# Patient Record
Sex: MALE | Race: OTHER | NOT HISPANIC OR LATINO | URBAN - METROPOLITAN AREA
[De-identification: names, ages, dates, MRNs, and addresses within clinical notes are randomized per-mention and may not be internally consistent; named-entity substitution may affect disease eponyms.]

---

## 2022-11-09 ENCOUNTER — INPATIENT (INPATIENT)
Facility: HOSPITAL | Age: 62
LOS: 0 days | Discharge: HOME | DRG: 287 | End: 2022-11-10
Attending: GENERAL PRACTICE | Admitting: GENERAL PRACTICE
Payer: MEDICAID

## 2022-11-09 ENCOUNTER — TRANSCRIPTION ENCOUNTER (OUTPATIENT)
Age: 62
End: 2022-11-09

## 2022-11-09 VITALS
TEMPERATURE: 98 F | DIASTOLIC BLOOD PRESSURE: 99 MMHG | SYSTOLIC BLOOD PRESSURE: 173 MMHG | HEART RATE: 66 BPM | WEIGHT: 169.98 LBS | RESPIRATION RATE: 18 BRPM | HEIGHT: 68 IN | OXYGEN SATURATION: 99 %

## 2022-11-09 DIAGNOSIS — R07.9 CHEST PAIN, UNSPECIFIED: ICD-10-CM

## 2022-11-09 DIAGNOSIS — I25.10 ATHEROSCLEROTIC HEART DISEASE OF NATIVE CORONARY ARTERY WITHOUT ANGINA PECTORIS: ICD-10-CM

## 2022-11-09 DIAGNOSIS — I10 ESSENTIAL (PRIMARY) HYPERTENSION: ICD-10-CM

## 2022-11-09 LAB
ALBUMIN SERPL ELPH-MCNC: 4.5 G/DL — SIGNIFICANT CHANGE UP (ref 3.3–5)
ALP SERPL-CCNC: 61 U/L — SIGNIFICANT CHANGE UP (ref 40–120)
ALT FLD-CCNC: 18 U/L — SIGNIFICANT CHANGE UP (ref 10–45)
ANION GAP SERPL CALC-SCNC: 12 MMOL/L — SIGNIFICANT CHANGE UP (ref 5–17)
APTT BLD: 33.4 SEC — SIGNIFICANT CHANGE UP (ref 27.5–35.5)
AST SERPL-CCNC: 20 U/L — SIGNIFICANT CHANGE UP (ref 10–40)
BASE EXCESS BLDV CALC-SCNC: 0 MMOL/L — SIGNIFICANT CHANGE UP (ref -2–3)
BASOPHILS # BLD AUTO: 0.04 K/UL — SIGNIFICANT CHANGE UP (ref 0–0.2)
BASOPHILS NFR BLD AUTO: 0.9 % — SIGNIFICANT CHANGE UP (ref 0–2)
BILIRUB SERPL-MCNC: 0.3 MG/DL — SIGNIFICANT CHANGE UP (ref 0.2–1.2)
BUN SERPL-MCNC: 13 MG/DL — SIGNIFICANT CHANGE UP (ref 7–23)
CA-I SERPL-SCNC: 1.21 MMOL/L — SIGNIFICANT CHANGE UP (ref 1.15–1.33)
CALCIUM SERPL-MCNC: 9.4 MG/DL — SIGNIFICANT CHANGE UP (ref 8.4–10.5)
CHLORIDE BLDV-SCNC: 104 MMOL/L — SIGNIFICANT CHANGE UP (ref 96–108)
CHLORIDE SERPL-SCNC: 104 MMOL/L — SIGNIFICANT CHANGE UP (ref 96–108)
CHOLEST SERPL-MCNC: 194 MG/DL — SIGNIFICANT CHANGE UP
CK MB CFR SERPL CALC: 1.4 NG/ML — SIGNIFICANT CHANGE UP (ref 0–6.7)
CO2 BLDV-SCNC: 28 MMOL/L — HIGH (ref 22–26)
CO2 SERPL-SCNC: 24 MMOL/L — SIGNIFICANT CHANGE UP (ref 22–31)
CREAT SERPL-MCNC: 0.96 MG/DL — SIGNIFICANT CHANGE UP (ref 0.5–1.3)
EGFR: 89 ML/MIN/1.73M2 — SIGNIFICANT CHANGE UP
EOSINOPHIL # BLD AUTO: 0.16 K/UL — SIGNIFICANT CHANGE UP (ref 0–0.5)
EOSINOPHIL NFR BLD AUTO: 3.7 % — SIGNIFICANT CHANGE UP (ref 0–6)
GAS PNL BLDV: 135 MMOL/L — LOW (ref 136–145)
GAS PNL BLDV: SIGNIFICANT CHANGE UP
GAS PNL BLDV: SIGNIFICANT CHANGE UP
GLUCOSE BLDV-MCNC: 103 MG/DL — HIGH (ref 70–99)
GLUCOSE SERPL-MCNC: 106 MG/DL — HIGH (ref 70–99)
HCO3 BLDV-SCNC: 26 MMOL/L — SIGNIFICANT CHANGE UP (ref 22–29)
HCT VFR BLD CALC: 40.8 % — SIGNIFICANT CHANGE UP (ref 39–50)
HCT VFR BLDA CALC: 41 % — SIGNIFICANT CHANGE UP (ref 39–51)
HDLC SERPL-MCNC: 69 MG/DL — SIGNIFICANT CHANGE UP
HGB BLD CALC-MCNC: 13.6 G/DL — SIGNIFICANT CHANGE UP (ref 12.6–17.4)
HGB BLD-MCNC: 13.5 G/DL — SIGNIFICANT CHANGE UP (ref 13–17)
IMM GRANULOCYTES NFR BLD AUTO: 0.2 % — SIGNIFICANT CHANGE UP (ref 0–0.9)
INR BLD: 1.03 RATIO — SIGNIFICANT CHANGE UP (ref 0.88–1.16)
LACTATE BLDV-MCNC: 1.1 MMOL/L — SIGNIFICANT CHANGE UP (ref 0.5–2)
LIPID PNL WITH DIRECT LDL SERPL: 111 MG/DL — HIGH
LYMPHOCYTES # BLD AUTO: 1.48 K/UL — SIGNIFICANT CHANGE UP (ref 1–3.3)
LYMPHOCYTES # BLD AUTO: 34.3 % — SIGNIFICANT CHANGE UP (ref 13–44)
MAGNESIUM SERPL-MCNC: 2.2 MG/DL — SIGNIFICANT CHANGE UP (ref 1.6–2.6)
MCHC RBC-ENTMCNC: 30.7 PG — SIGNIFICANT CHANGE UP (ref 27–34)
MCHC RBC-ENTMCNC: 33.1 GM/DL — SIGNIFICANT CHANGE UP (ref 32–36)
MCV RBC AUTO: 92.7 FL — SIGNIFICANT CHANGE UP (ref 80–100)
MONOCYTES # BLD AUTO: 0.31 K/UL — SIGNIFICANT CHANGE UP (ref 0–0.9)
MONOCYTES NFR BLD AUTO: 7.2 % — SIGNIFICANT CHANGE UP (ref 2–14)
NEUTROPHILS # BLD AUTO: 2.31 K/UL — SIGNIFICANT CHANGE UP (ref 1.8–7.4)
NEUTROPHILS NFR BLD AUTO: 53.7 % — SIGNIFICANT CHANGE UP (ref 43–77)
NON HDL CHOLESTEROL: 125 MG/DL — SIGNIFICANT CHANGE UP
NRBC # BLD: 0 /100 WBCS — SIGNIFICANT CHANGE UP (ref 0–0)
NT-PROBNP SERPL-SCNC: 24 PG/ML — SIGNIFICANT CHANGE UP (ref 0–300)
PCO2 BLDV: 49 MMHG — SIGNIFICANT CHANGE UP (ref 42–55)
PH BLDV: 7.34 — SIGNIFICANT CHANGE UP (ref 7.32–7.43)
PLATELET # BLD AUTO: 167 K/UL — SIGNIFICANT CHANGE UP (ref 150–400)
PO2 BLDV: 27 MMHG — SIGNIFICANT CHANGE UP (ref 25–45)
POTASSIUM BLDV-SCNC: 3.9 MMOL/L — SIGNIFICANT CHANGE UP (ref 3.5–5.1)
POTASSIUM SERPL-MCNC: 4.1 MMOL/L — SIGNIFICANT CHANGE UP (ref 3.5–5.3)
POTASSIUM SERPL-SCNC: 4.1 MMOL/L — SIGNIFICANT CHANGE UP (ref 3.5–5.3)
PROT SERPL-MCNC: 7.7 G/DL — SIGNIFICANT CHANGE UP (ref 6–8.3)
PROTHROM AB SERPL-ACNC: 12 SEC — SIGNIFICANT CHANGE UP (ref 10.5–13.4)
RBC # BLD: 4.4 M/UL — SIGNIFICANT CHANGE UP (ref 4.2–5.8)
RBC # FLD: 12.6 % — SIGNIFICANT CHANGE UP (ref 10.3–14.5)
SAO2 % BLDV: 43.1 % — LOW (ref 67–88)
SARS-COV-2 RNA SPEC QL NAA+PROBE: SIGNIFICANT CHANGE UP
SARS-COV-2 RNA SPEC QL NAA+PROBE: SIGNIFICANT CHANGE UP
SODIUM SERPL-SCNC: 140 MMOL/L — SIGNIFICANT CHANGE UP (ref 135–145)
TRIGL SERPL-MCNC: 69 MG/DL — SIGNIFICANT CHANGE UP
TROPONIN T, HIGH SENSITIVITY RESULT: 7 NG/L — SIGNIFICANT CHANGE UP (ref 0–51)
TROPONIN T, HIGH SENSITIVITY RESULT: 7 NG/L — SIGNIFICANT CHANGE UP (ref 0–51)
TSH SERPL-MCNC: 1.93 UIU/ML — SIGNIFICANT CHANGE UP (ref 0.27–4.2)
UFH PPP CHRO-ACNC: 0.05 IU/ML — LOW (ref 0.3–0.7)
WBC # BLD: 4.31 K/UL — SIGNIFICANT CHANGE UP (ref 3.8–10.5)
WBC # FLD AUTO: 4.31 K/UL — SIGNIFICANT CHANGE UP (ref 3.8–10.5)

## 2022-11-09 PROCEDURE — 99152 MOD SED SAME PHYS/QHP 5/>YRS: CPT

## 2022-11-09 PROCEDURE — 99285 EMERGENCY DEPT VISIT HI MDM: CPT

## 2022-11-09 PROCEDURE — 93010 ELECTROCARDIOGRAM REPORT: CPT

## 2022-11-09 PROCEDURE — 93458 L HRT ARTERY/VENTRICLE ANGIO: CPT | Mod: 26

## 2022-11-09 PROCEDURE — 99223 1ST HOSP IP/OBS HIGH 75: CPT | Mod: GC

## 2022-11-09 PROCEDURE — 71046 X-RAY EXAM CHEST 2 VIEWS: CPT | Mod: 26

## 2022-11-09 RX ORDER — ONDANSETRON 8 MG/1
4 TABLET, FILM COATED ORAL EVERY 8 HOURS
Refills: 0 | Status: DISCONTINUED | OUTPATIENT
Start: 2022-11-09 | End: 2022-11-10

## 2022-11-09 RX ORDER — LANOLIN ALCOHOL/MO/W.PET/CERES
3 CREAM (GRAM) TOPICAL AT BEDTIME
Refills: 0 | Status: DISCONTINUED | OUTPATIENT
Start: 2022-11-09 | End: 2022-11-10

## 2022-11-09 RX ORDER — SODIUM CHLORIDE 9 MG/ML
1000 INJECTION INTRAMUSCULAR; INTRAVENOUS; SUBCUTANEOUS
Refills: 0 | Status: DISCONTINUED | OUTPATIENT
Start: 2022-11-09 | End: 2022-11-10

## 2022-11-09 RX ORDER — ASPIRIN/CALCIUM CARB/MAGNESIUM 324 MG
81 TABLET ORAL DAILY
Refills: 0 | Status: DISCONTINUED | OUTPATIENT
Start: 2022-11-09 | End: 2022-11-10

## 2022-11-09 RX ORDER — CARVEDILOL PHOSPHATE 80 MG/1
6.25 CAPSULE, EXTENDED RELEASE ORAL EVERY 12 HOURS
Refills: 0 | Status: DISCONTINUED | OUTPATIENT
Start: 2022-11-09 | End: 2022-11-10

## 2022-11-09 RX ORDER — SODIUM CHLORIDE 9 MG/ML
250 INJECTION INTRAMUSCULAR; INTRAVENOUS; SUBCUTANEOUS ONCE
Refills: 0 | Status: COMPLETED | OUTPATIENT
Start: 2022-11-09 | End: 2022-11-09

## 2022-11-09 RX ORDER — ACETAMINOPHEN 500 MG
650 TABLET ORAL EVERY 6 HOURS
Refills: 0 | Status: DISCONTINUED | OUTPATIENT
Start: 2022-11-09 | End: 2022-11-10

## 2022-11-09 RX ADMIN — SODIUM CHLORIDE 75 MILLILITER(S): 9 INJECTION INTRAMUSCULAR; INTRAVENOUS; SUBCUTANEOUS at 16:18

## 2022-11-09 RX ADMIN — CARVEDILOL PHOSPHATE 6.25 MILLIGRAM(S): 80 CAPSULE, EXTENDED RELEASE ORAL at 13:43

## 2022-11-09 RX ADMIN — SODIUM CHLORIDE 750 MILLILITER(S): 9 INJECTION INTRAMUSCULAR; INTRAVENOUS; SUBCUTANEOUS at 16:19

## 2022-11-09 RX ADMIN — Medication 81 MILLIGRAM(S): at 13:42

## 2022-11-09 NOTE — H&P ADULT - PROBLEM SELECTOR PLAN 1
appreciated cardiology consult  cath today given history  medical optimization  ( pt not taking any meds at baseline !!)   tele  echo  monitor

## 2022-11-09 NOTE — ED ADULT NURSE NOTE - HISTORY OF COVID-19 VACCINATION
Try taking your metoprolol earlier in the day by around 9AM.  Continue potassium supplement, take the magnesium supplement as prescribed in addition.  Discontinue any additional magnesium you are taking.     No

## 2022-11-09 NOTE — H&P ADULT - PROBLEM SELECTOR PLAN 3
pt not on meds at home  continue meds as above  adjust as needed  discussed with pt re compliance and follow up

## 2022-11-09 NOTE — ED PROVIDER NOTE - ATTENDING CONTRIBUTION TO CARE
Dr. Dacosta (Attending Physician)  I performed a history and physical exam of the patient and discussed their management with the resident. I reviewed the resident's note and agree with the documented findings and plan of care. My medical decision making and observations are found above.

## 2022-11-09 NOTE — ED PROVIDER NOTE - CLINICAL SUMMARY MEDICAL DECISION MAKING FREE TEXT BOX
Cards Fellow Guero contacted, will see patient, pt appears well, hypertensive to 170s systolic, pt with little pain, will observe and reassess pending results. Pt. with ho cad pw left sided chest pain now improved. initial trop negative at [facility]. has LBBB without Sgarbossa criteria. Will repeat trop d/w Cards Fellow Guero contacted, will see patient, pt appears well, hypertensive to 170s systolic, pt with little pain, will observe and reassess pending results.    Dr. Dacosta (Attending Physician)

## 2022-11-09 NOTE — PATIENT PROFILE ADULT - FALL HARM RISK - UNIVERSAL INTERVENTIONS
Bed in lowest position, wheels locked, appropriate side rails in place/Call bell, personal items and telephone in reach/Instruct patient to call for assistance before getting out of bed or chair/Non-slip footwear when patient is out of bed/[place] to call system/Physically safe environment - no spills, clutter or unnecessary equipment/Purposeful Proactive Rounding/Room/bathroom lighting operational, light cord in reach

## 2022-11-09 NOTE — CONSULT NOTE ADULT - SUBJECTIVE AND OBJECTIVE BOX
Patient seen and evaluated at bedside    Chief Complaint: Chest pain    HPI:    62-year-old male with a past medical history of hypertension, high cholesterol, CAD with stent placed in , noncompliant with medications presenting with chest pain began this morning approximately 6 AM. He was walking to his car this morning when he felt a sharp left side chest pain that did not radiate. He denies any diaphoresis, nauseaa/vomting, shortness of breath. States that it is a pressure-like pain in his left chest that has decreased spontaneously, is down from a 5-6 down to a 1-2. Patient was transferred by EMS from [facility] for concerns for possible new left bundle branch block.  Patient received aspirin and Plavix according to EMS at [facility].   Of note, patient has been having chest pain on exertion, especially when he walks faster, this has been going on for 5-6 months. The pain is relieved with rest. He notes that he had a cath in  that showed only 35% blockages in his coronary arteries. He has not had an ischemic workup since. Patient states that he is noncompliant with his medications, occasionally sees his doctor, Dr. Ramona Tony. Denies any numbness, tingling, paresthesias, trouble breathing, dizziness, headache, rash, vision changes, hearing changes.      PMHx:   HTN (hypertension)    CAD (coronary artery disease)    H/O heart artery stent        PSHx:       Allergies:  No Known Allergies      Home Meds:    Patient does not take any meds    Current Medications:       FAMILY HISTORY:    Father  of MI at 73      Social History:  NEver smoker, drinks 2 shots of whiskey nightly. No drugs    REVIEW OF SYSTEMS:  Constitutional:     [ ] negative [ ] fevers [ ] chills [ ] weight loss [ ] weight gain  HEENT:                  [ ] negative [ ] dry eyes [ ] eye irritation [ ] postnasal drip [ ] nasal congestion  CV:                         [ ] negative  [ ] chest pain [ ] orthopnea [ ] palpitations [ ] murmur  Resp:                     [ ] negative [ ] cough [ ] shortness of breath [ ] dyspnea [ ] wheezing [ ] sputum [ ]hemoptysis  GI:                          [ ] negative [ ] nausea [ ] vomiting [ ] diarrhea [ ] constipation [ ] abd pain [ ] dysphagia   :                        [ ] negative [ ] dysuria [ ] nocturia [ ] hematuria [ ] increased urinary frequency  Musculoskeletal: [ ] negative [ ] back pain [ ] myalgias [ ] arthralgias [ ] fracture  Skin:                       [ ] negative [ ] rash [ ] itch  Neurological:        [ ] negative [ ] headache [ ] dizziness [ ] syncope [ ] weakness [ ] numbness  Psychiatric:           [ ] negative [ ] anxiety [ ] depression  Endocrine:            [ ] negative [ ] diabetes [ ] thyroid problem  Heme/Lymph:      [ ] negative [ ] anemia [ ] bleeding problem  Allergic/Immune: [ ] negative [ ] itchy eyes [ ] nasal discharge [ ] hives [ ] angioedema    [ ] All other systems negative  [ ] Unable to assess ROS due to      Physical Exam:  T(F): 97.8 (), Max: 98 ()  HR: 71 () (66 - 71)  BP: 187/102 () (173/99 - 187/102)  RR: 20 ()  SpO2: 100% ()  GENERAL: No acute distress, well-developed  HEAD:  Atraumatic, Normocephalic  ENT: EOMI, PERRLA, conjunctiva and sclera clear, Neck supple, No JVD, moist mucosa  CHEST/LUNG: Clear to auscultation bilaterally; No wheeze, equal breath sounds bilaterally   BACK: No spinal tenderness  HEART: Regular rate and rhythm; No murmurs, rubs, or gallops  ABDOMEN: Soft, Nontender, Nondistended; Bowel sounds present  EXTREMITIES:  No clubbing, cyanosis, or edema    Cardiovascular Diagnostic Testing:    ECG: Personally reviewed:   sinus rhythm with rate of 60, LBBB pattern with no prior EKG    Echo: Personally reviewed:    Imaging:    CXR: Personally reviewed    Labs: Personally reviewed                        13.5   4.31  )-----------( 167      ( 2022 09:32 )             40.8         140  |  104  |  13  ----------------------------<  106<H>  4.1   |  24  |  0.96    Ca    9.4      2022 09:32  Mg     2.2         TPro  7.7  /  Alb  4.5  /  TBili  0.3  /  DBili  x   /  AST  20  /  ALT  18  /  AlkPhos  61        Serum Pro-Brain Natriuretic Peptide: 24 pg/mL ( @ 09:32)            CARDIAC MARKERS ( 2022 09:32 )  7 ng/L / x     / x     / x     / x     / x          No Known Allergies      T(F): 97.8 (), Max: 98 ()  HR: 71 () (66 - 71)  BP: 187/102 () (173/99 - 187/102)  RR: 20 ()  SpO2: 100% () Patient seen and evaluated at bedside    Chief Complaint: Chest pain    HPI:  62-year-old male with a past medical history of hypertension, high cholesterol with diagnostic angiogram done in , non-compliant with medications.  Now presenting with chest pain which began this morning at approximately 6 AM. He was walking to his car when he felt a left sided chest pain that did not radiate. He denies any diaphoresis, nausea/vomiting, shortness of breath.  He states that it was a pressure-like pain that decreased spontaneously, down from a 5-6 intensity to a 1-2.  He was seen in ED at [facility] and transferred here due to concern of a new left bundle branch block.  Patient received aspirin and Plavix at [facility].     In retrospect, patient has been having chest pain on exertion over past 6 mos., especially when he walks faster, relieved with rest.     He recall that he had a cath in  that showed only 35% blockage in his coronary arteries. He has not had an ischemic testing since. Patient acknowledges that he is non compliant with his medications and only occasionally sees his doctor, Dr. Ramona Tony.     Denies any numbness, tingling, paresthesias, trouble breathing, dizziness, headache, rash, vision changes, hearing changes.      PMHx:   HTN (hypertension)  CAD (coronary artery disease)      Allergies:  No Known Allergies      Home Meds:  Patient does not take any meds      FAMILY HISTORY:    Father  of MI at 73      Social History:  Never smoker, drinks 2 shots of whiskey nightly. No drugs      REVIEW OF SYSTEMS:  Constitutional:     [ x] negative [ ] fevers [ ] chills [ ] weight loss [ ] weight gain  HEENT:                  [x ] negative [ ] dry eyes [ ] eye irritation [ ] postnasal drip [ ] nasal congestion  CV:                         [ ] negative  [ x] chest pain [ ] orthopnea [ ] palpitations [ ] murmur  Resp:                     [ x] negative [ ] cough [ ] shortness of breath [ ] dyspnea [ ] wheezing [ ] sputum [ ]hemoptysis  GI:                          [x ] negative [ ] nausea [ ] vomiting [ ] diarrhea [ ] constipation [ ] abd pain [ ] dysphagia   :                        [x ] negative [ ] dysuria [ ] nocturia [ ] hematuria [ ] increased urinary frequency  Musculoskeletal: [x ] negative [ ] back pain [ ] myalgias [ ] arthralgias [ ] fracture  Skin:                       [x ] negative [ ] rash [ ] itch  Neurological:        [x ] negative [ ] headache [ ] dizziness [ ] syncope [ ] weakness [ ] numbness  Psychiatric:           [x ] negative [ ] anxiety [ ] depression  Endocrine:            [x ] negative [ ] diabetes [ ] thyroid problem  Heme/Lymph:      [x ] negative [ ] anemia [ ] bleeding problem  Allergic/Immune: [x ] negative [ ] itchy eyes [ ] nasal discharge [ ] hives [ ] angioedema  [x ] All other systems negative      Physical Exam:  T(F): 97.8 (), Max: 98 ()  HR: 71 () (66 - 71)  BP: 187/102 () (173/99 - 187/102)  RR: 20 ()  SpO2: 100% ()    GENERAL: No acute distress, well-developed  HEAD:  Atraumatic, Normocephalic  ENT: EOMI, PERRLA, conjunctiva and sclera clear, Neck supple, No JVD, moist mucosa  CHEST/LUNG: Clear to auscultation bilaterally; No wheeze, equal breath sounds bilaterally   BACK: No spinal tenderness  HEART: Regular rate and rhythm; No murmurs, rubs, or gallops  ABDOMEN: Soft, Nontender, Nondistended; Bowel sounds present  EXTREMITIES:  No clubbing, cyanosis, or edema      ECG:   NSR at 60 bpm; LBBB pattern.  No prior EKG available.      CXR:  Clear lungs      LABS:                      13.5   4.31  )-----------( 167      ( 2022 09:32 )             40.8       140  |  104  |  13  ----------------------------<  106<H>  4.1   |  24  |  0.96    Ca    9.4      2022 09:32  Mg     2.2         TPro  7.7  /  Alb  4.5  /  TBili  0.3  /  DBili  x   /  AST  20  /  ALT  18  /  AlkPhos  61      Serum Pro-Brain Natriuretic Peptide: 24 pg/mL ( @ 09:32)      Trop T:   7 -> 7

## 2022-11-09 NOTE — CONSULT NOTE ADULT - ASSESSMENT
62-year-old male with a past medical history of hypertension, high cholesterol, CAD with stent placed in 2008, noncompliant with medications presenting with chest pain began this morning approximately 6 AM. He also has been having chest pain with exertin over the past 5-6 months consistent with angina.     #Unstable angina  Recommendations:  - Will talk to interventional attending regarding plan for cath  - Start patient on ASA 81mg  - Check LFT, lipid profile, and TSH.  - Most likely will need to be on a high intensity statin  - Start patient on coreg 6.25 BID as patient is currently hypertensive  - Continue to monitor on telemetry  - Please obtain TTE  - Trop negative, can stop trending  - trend CMP, Mg, phos, CBC  - maintain K > 4, Mg > 2  - maintain active T&S, Hgb > 8; will monitor for s/s of bleeding    62-year-old male with a past medical history of hypertension, high cholesterol, non obstructive CAD at cath in 2008.  Non compliant with medications.  Now presenting with chest pain this morning approximately 6 AM; in retrospect, he also has been having chest pain with exertion over the past 5-6 months, consistent with angina.       REC:  #1  Unstable angina  - Will talk to interventional attending regarding plan for cath  - Start patient on ASA 81mg  - Check LFT, lipid profile, and TSH.  - Most likely will need to be on a high intensity statin  - Continue to monitor on telemetry  - Please obtain TTE  - Trop negative, can stop trending  - trend CMP, Mg, phos, CBC  - maintain K > 4, Mg > 2  - maintain active T&S, Hgb > 8; will monitor for s/s of bleeding     #2  HTN  - Start patient on Coreg 6.25 BID as patient is currently hypertensive      Keshav Mariano M.D.  Cardiology fellow    Plan discussed with cardiology fellow.  Patient seen and examined.  Hx., exam and labs as above.  I agree with the assessment and recommendations, which I have reviewed and edited where appropriate.  Luisito Daniel M.D.  Cardiology Attending, Consult Service    For Cardiology consults and questions, all Cardiology service information can be found 24/7 on [email or web address] - use password: [email or web address] to log in.

## 2022-11-09 NOTE — H&P ADULT - HISTORY OF PRESENT ILLNESS
>>>>>>Medical Attending Initial / Admission note<<<<<<  -------------------------------------------------------------------------------  CHIEF COMPLAINT & HISTORY OF PRESENT ILLNESS:      Patient is a 63yo Male with past medical history as bellow presented to ED with chest pain in the left side of chest  chest pain began this morning approximately 6 AM.  Patient was transferred by EMS from [facility] for concerns for possible new left bundle branch block.    Patient received aspirin and Plavix according to EMS at [facility], patient denies any radiation of the chest pain to his arms, neck, back.  States that it is a pressure-like pain in his left chest that has decreased spontaneously, is down from a 5-6 down to a 1-2.  Patient states that he is noncompliant with his medications, occasionally sees his doctor, Dr. Ramona Tony.  Denies any numbness, tingling, paresthesias, trouble breathing, dizziness, headache, rash, vision changes, hearing changes  symptoms all resolved now     --------------------------------------------------------------------------------  PAST MEDICAL / SURGICAL  HISTORY:    ? h/o HTN , not on meds   CAD post ? stent in 2008    no surgeries reported    --------------------------------------------------------------------------------  FAMILY HISTORY:    Father: MI    --------------------------------------------------------------------------------  SOCIAL HISTORY:  Alcohol: occasional ETOH use : 4X/ week  Smoking: None reported    no drugs, MJ  works as a      --------------------------------------------------------------------------------  ALLERGIES:     none reported     --------------------------------------------------------------------------------  MEDICATIONS:    none reported , no vitamins / supplements either     --------------------------------------------------------------------------------  REVIEW OF SYSTEM:    GEN: no fever, no chills, no pain now   RESP: no SOB, no cough, no sputum  CVS: no chest pain now, no palpitations, no edema  GI: no abdominal pain, no nausea, no vomiting, no constipation, no diarrhea  : no dysuria, no frequency, no hematuria  Neuro: no headache, no dizziness  PSYCH: no anxiety, no depression  Derm : no itching, no rash    --------------------------------------------------------------------------------  VITAL SIGNS:  Height (cm): 172.7  Weight (kg): 76  BMI (kg/m2): 25.5  Vital Signs Last 24 HrsT(C): 36.6 (11-09-22 @ 10:55)  T(F): 97.9 (11-09-22 @ 10:55), Max: 98 (11-09-22 @ 09:01)  HR: 55 (11-09-22 @ 10:55) (55 - 71)  BP: 161/90 (11-09-22 @ 10:55)  RR: 18 (11-09-22 @ 10:55) (18 - 20)  SpO2: 98% (11-09-22 @ 10:55) (98% - 100%)      --------------------------------------------------------------------------------  PHYSICAL EXAM:    GEN: A&O X 3 , NAD , comfortable, pleasant, calm  HEENT: NCAT, PERRL, MMM, hearing intact  Neck: supple , no JVD  CVS: S1S2 , regular , No M/R/G appreciated  PULM: CTA B/L,  no W/R/R appreciated  ABD.: soft. non tender, non distended,  bowel sounds present  Extrem: intact pulses , no edema   Derm: No rash , no ecchymoses  PSYCH : normal mood,  no delusion not anxious    --------------------------------------------------------------------------------  LAB AND IMAGING:   [As muriel, reviewed]    --------------------------------------------------------------------------------  ASSESSMENT AND PLAN:   [As bellow]    --------------------  Case discussed with pt  ___________________________  H. LIA Adair.  Pager: [phone]  11-09-22

## 2022-11-09 NOTE — H&P ADULT - ASSESSMENT
cp  HTN   Patient is a 61yo Male with past medical history as bellow presented to ED with chest pain in the left side of chest  chest pain began this morning approximately 6 AM.  Patient was transferred by EMS from [facility] for concerns for possible new left bundle branch block.    Patient received aspirin and Plavix according to EMS at [facility], patient denies any radiation of the chest pain to his arms, neck, back.  States that it is a pressure-like pain in his left chest that has decreased spontaneously, is down from a 5-6 down to a 1-2.  Patient states that he is noncompliant with his medications, occasionally sees his doctor, Dr. Ramona Tony.  Denies any numbness, tingling, paresthesias, trouble breathing, dizziness, headache, rash, vision changes, hearing changes  symptoms all resolved now

## 2022-11-09 NOTE — ED ADULT NURSE NOTE - OBJECTIVE STATEMENT
62 y male presents transfer from [place] for chest pain beginning at 0600 this morning. patient reports to working as a - started work and began having 5/10 L sided chest pain; denies sob, lightheadedness, dizziness, n/v/d, radiating pain. received 300 mg Plavix and 325 ASA at [place] ay 0826. a7ox3. skin warm and dry. breathing comfortably in bed- no distress. reports to 3/10 chest pain currently- placed on cardiac monitor- HR in 60s-70s noted. abdomen soft nondistended. MD at bedside; ekg obtained- awaiting labs radiology and dispo.

## 2022-11-09 NOTE — ED PROVIDER NOTE - NSICDXPASTMEDICALHX_GEN_ALL_CORE_FT
PAST MEDICAL HISTORY:  CAD (coronary artery disease)     H/O heart artery stent     HTN (hypertension)

## 2022-11-09 NOTE — ED PROVIDER NOTE - PHYSICAL EXAMINATION
Physical Exam:   GEN: in no acute distress, AAOx3  HENT: NCAT, MMM, no stridor  EYES: PERRLA, EOMI  RESP: CTAB, no respiratory distress  CV: normal rate and regular rhythm, S1 S2. no mrgs  ABD: soft and NTND  EXT: No edema, No bony deformity of extremities  SKIN: No skin breaks, skin color normal for race  NEURO: CN grossly intact, No focal motor or sensory deficits.

## 2022-11-09 NOTE — ED PROVIDER NOTE - OBJECTIVE STATEMENT
62-year-old male with a past medical history of hypertension, high cholesterol, CAD with stent placed in 2008, noncompliant with medications presenting with chest pain began this morning approximately 6 AM.  Patient was transferred by EMS from [facility] for concerns for possible new left bundle branch block.  Patient received aspirin and Plavix according to EMS at [facility], patient denies any radiation of the chest pain to his arms, neck, back.  States that it is a pressure-like pain in his left chest that has decreased spontaneously, is down from a 5-6 down to a 1-2.  Patient states that he is noncompliant with his medications, occasionally sees his doctor, Dr. Ramona Tony.Denies any numbness, tingling, paresthesias, trouble breathing, dizziness, headache, rash, vision changes, hearing changes.

## 2022-11-09 NOTE — ED PROVIDER NOTE - PROGRESS NOTE DETAILS
Pt labs grossly normal, pt with some residual pain on reassessment, discussed the case with cardiology, which despite nega repeat troponin here (neg at [facility] as well), decision was made to due exploratory catheterization, discussed the case with dr. izquierdo, who will admit the patient under his service. pt endorsed in stable condition.

## 2022-11-10 ENCOUNTER — TRANSCRIPTION ENCOUNTER (OUTPATIENT)
Age: 62
End: 2022-11-10

## 2022-11-10 VITALS
TEMPERATURE: 98 F | DIASTOLIC BLOOD PRESSURE: 87 MMHG | SYSTOLIC BLOOD PRESSURE: 150 MMHG | RESPIRATION RATE: 19 BRPM | HEART RATE: 56 BPM | OXYGEN SATURATION: 98 %

## 2022-11-10 PROBLEM — Z00.00 ENCOUNTER FOR PREVENTIVE HEALTH EXAMINATION: Status: ACTIVE | Noted: 2022-11-10

## 2022-11-10 LAB
ALBUMIN SERPL ELPH-MCNC: 3.9 G/DL — SIGNIFICANT CHANGE UP (ref 3.3–5)
ALP SERPL-CCNC: 54 U/L — SIGNIFICANT CHANGE UP (ref 40–120)
ALT FLD-CCNC: 17 U/L — SIGNIFICANT CHANGE UP (ref 10–45)
ANION GAP SERPL CALC-SCNC: 12 MMOL/L — SIGNIFICANT CHANGE UP (ref 5–17)
AST SERPL-CCNC: 21 U/L — SIGNIFICANT CHANGE UP (ref 10–40)
BASOPHILS # BLD AUTO: 0.04 K/UL — SIGNIFICANT CHANGE UP (ref 0–0.2)
BASOPHILS NFR BLD AUTO: 0.9 % — SIGNIFICANT CHANGE UP (ref 0–2)
BILIRUB SERPL-MCNC: 0.4 MG/DL — SIGNIFICANT CHANGE UP (ref 0.2–1.2)
BUN SERPL-MCNC: 17 MG/DL — SIGNIFICANT CHANGE UP (ref 7–23)
CALCIUM SERPL-MCNC: 9 MG/DL — SIGNIFICANT CHANGE UP (ref 8.4–10.5)
CHLORIDE SERPL-SCNC: 103 MMOL/L — SIGNIFICANT CHANGE UP (ref 96–108)
CO2 SERPL-SCNC: 22 MMOL/L — SIGNIFICANT CHANGE UP (ref 22–31)
CREAT SERPL-MCNC: 1.31 MG/DL — HIGH (ref 0.5–1.3)
EGFR: 61 ML/MIN/1.73M2 — SIGNIFICANT CHANGE UP
EOSINOPHIL # BLD AUTO: 0.22 K/UL — SIGNIFICANT CHANGE UP (ref 0–0.5)
EOSINOPHIL NFR BLD AUTO: 4.8 % — SIGNIFICANT CHANGE UP (ref 0–6)
GLUCOSE SERPL-MCNC: 102 MG/DL — HIGH (ref 70–99)
HCT VFR BLD CALC: 39.3 % — SIGNIFICANT CHANGE UP (ref 39–50)
HGB BLD-MCNC: 13.1 G/DL — SIGNIFICANT CHANGE UP (ref 13–17)
IMM GRANULOCYTES NFR BLD AUTO: 0.2 % — SIGNIFICANT CHANGE UP (ref 0–0.9)
LYMPHOCYTES # BLD AUTO: 1.6 K/UL — SIGNIFICANT CHANGE UP (ref 1–3.3)
LYMPHOCYTES # BLD AUTO: 35.1 % — SIGNIFICANT CHANGE UP (ref 13–44)
MCHC RBC-ENTMCNC: 30.5 PG — SIGNIFICANT CHANGE UP (ref 27–34)
MCHC RBC-ENTMCNC: 33.3 GM/DL — SIGNIFICANT CHANGE UP (ref 32–36)
MCV RBC AUTO: 91.6 FL — SIGNIFICANT CHANGE UP (ref 80–100)
MONOCYTES # BLD AUTO: 0.39 K/UL — SIGNIFICANT CHANGE UP (ref 0–0.9)
MONOCYTES NFR BLD AUTO: 8.6 % — SIGNIFICANT CHANGE UP (ref 2–14)
NEUTROPHILS # BLD AUTO: 2.3 K/UL — SIGNIFICANT CHANGE UP (ref 1.8–7.4)
NEUTROPHILS NFR BLD AUTO: 50.4 % — SIGNIFICANT CHANGE UP (ref 43–77)
NRBC # BLD: 0 /100 WBCS — SIGNIFICANT CHANGE UP (ref 0–0)
PLATELET # BLD AUTO: 165 K/UL — SIGNIFICANT CHANGE UP (ref 150–400)
POTASSIUM SERPL-MCNC: 3.8 MMOL/L — SIGNIFICANT CHANGE UP (ref 3.5–5.3)
POTASSIUM SERPL-SCNC: 3.8 MMOL/L — SIGNIFICANT CHANGE UP (ref 3.5–5.3)
PROT SERPL-MCNC: 7 G/DL — SIGNIFICANT CHANGE UP (ref 6–8.3)
RBC # BLD: 4.29 M/UL — SIGNIFICANT CHANGE UP (ref 4.2–5.8)
RBC # FLD: 12.7 % — SIGNIFICANT CHANGE UP (ref 10.3–14.5)
SODIUM SERPL-SCNC: 137 MMOL/L — SIGNIFICANT CHANGE UP (ref 135–145)
WBC # BLD: 4.56 K/UL — SIGNIFICANT CHANGE UP (ref 3.8–10.5)
WBC # FLD AUTO: 4.56 K/UL — SIGNIFICANT CHANGE UP (ref 3.8–10.5)

## 2022-11-10 PROCEDURE — 85025 COMPLETE CBC W/AUTO DIFF WBC: CPT

## 2022-11-10 PROCEDURE — 99233 SBSQ HOSP IP/OBS HIGH 50: CPT | Mod: GC

## 2022-11-10 PROCEDURE — 82803 BLOOD GASES ANY COMBINATION: CPT

## 2022-11-10 PROCEDURE — 82330 ASSAY OF CALCIUM: CPT

## 2022-11-10 PROCEDURE — 84132 ASSAY OF SERUM POTASSIUM: CPT

## 2022-11-10 PROCEDURE — U0005: CPT

## 2022-11-10 PROCEDURE — 85730 THROMBOPLASTIN TIME PARTIAL: CPT

## 2022-11-10 PROCEDURE — 83880 ASSAY OF NATRIURETIC PEPTIDE: CPT

## 2022-11-10 PROCEDURE — C1887: CPT

## 2022-11-10 PROCEDURE — 85018 HEMOGLOBIN: CPT

## 2022-11-10 PROCEDURE — 84443 ASSAY THYROID STIM HORMONE: CPT

## 2022-11-10 PROCEDURE — C1769: CPT

## 2022-11-10 PROCEDURE — 93306 TTE W/DOPPLER COMPLETE: CPT | Mod: 26

## 2022-11-10 PROCEDURE — 80061 LIPID PANEL: CPT

## 2022-11-10 PROCEDURE — 82553 CREATINE MB FRACTION: CPT

## 2022-11-10 PROCEDURE — 80053 COMPREHEN METABOLIC PANEL: CPT

## 2022-11-10 PROCEDURE — 85014 HEMATOCRIT: CPT

## 2022-11-10 PROCEDURE — 84295 ASSAY OF SERUM SODIUM: CPT

## 2022-11-10 PROCEDURE — 71046 X-RAY EXAM CHEST 2 VIEWS: CPT

## 2022-11-10 PROCEDURE — 85520 HEPARIN ASSAY: CPT

## 2022-11-10 PROCEDURE — 82947 ASSAY GLUCOSE BLOOD QUANT: CPT

## 2022-11-10 PROCEDURE — 82435 ASSAY OF BLOOD CHLORIDE: CPT

## 2022-11-10 PROCEDURE — 93306 TTE W/DOPPLER COMPLETE: CPT

## 2022-11-10 PROCEDURE — 93458 L HRT ARTERY/VENTRICLE ANGIO: CPT

## 2022-11-10 PROCEDURE — 85610 PROTHROMBIN TIME: CPT

## 2022-11-10 PROCEDURE — 99285 EMERGENCY DEPT VISIT HI MDM: CPT

## 2022-11-10 PROCEDURE — C1894: CPT

## 2022-11-10 PROCEDURE — 83735 ASSAY OF MAGNESIUM: CPT

## 2022-11-10 PROCEDURE — 84484 ASSAY OF TROPONIN QUANT: CPT

## 2022-11-10 PROCEDURE — U0003: CPT

## 2022-11-10 PROCEDURE — 83605 ASSAY OF LACTIC ACID: CPT

## 2022-11-10 RX ORDER — CARVEDILOL PHOSPHATE 80 MG/1
1 CAPSULE, EXTENDED RELEASE ORAL
Qty: 60 | Refills: 0
Start: 2022-11-10 | End: 2022-12-09

## 2022-11-10 RX ORDER — ASPIRIN/CALCIUM CARB/MAGNESIUM 324 MG
1 TABLET ORAL
Qty: 30 | Refills: 0
Start: 2022-11-10 | End: 2022-12-09

## 2022-11-10 RX ORDER — ATORVASTATIN CALCIUM 80 MG/1
1 TABLET, FILM COATED ORAL
Qty: 30 | Refills: 0
Start: 2022-11-10 | End: 2022-12-09

## 2022-11-10 RX ORDER — ATORVASTATIN CALCIUM 80 MG/1
40 TABLET, FILM COATED ORAL AT BEDTIME
Refills: 0 | Status: DISCONTINUED | OUTPATIENT
Start: 2022-11-10 | End: 2022-11-10

## 2022-11-10 RX ADMIN — Medication 81 MILLIGRAM(S): at 06:07

## 2022-11-10 RX ADMIN — CARVEDILOL PHOSPHATE 6.25 MILLIGRAM(S): 80 CAPSULE, EXTENDED RELEASE ORAL at 06:07

## 2022-11-10 NOTE — DISCHARGE NOTE PROVIDER - HOSPITAL COURSE
HPI: Patient is a 61yo Male with past medical history as bellow presented to ED with chest pain in the left side of chest  chest pain began this morning approximately 6 AM.  Patient was transferred by EMS from [facility] for concerns for possible new left bundle branch block.    Patient received aspirin and Plavix according to EMS at [facility], patient denies any radiation of the chest pain to his arms, neck, back.  States that it is a pressure-like pain in his left chest that has decreased spontaneously, is down from a 5-6 down to a 1-2.  Patient states that he is noncompliant with his medications, occasionally sees his doctor, Dr. Ramona Tony.  Denies any numbness, tingling, paresthesias, trouble breathing, dizziness, headache, rash, vision changes, hearing changes  symptoms all resolved now   11/9 s/p diagnostic cath via RRA, site without hematoma/bleeding. Patient without complaint, hemodynamically stable overnight. Pending discharge in AM HPI: Patient is a 61yo Male with past medical history as muriel presented to ED with chest pain in the left side of chest  chest pain began this morning approximately 6 AM.  Patient was transferred by EMS from [facility] for concerns for possible new left bundle branch block.    Patient received aspirin and Plavix according to EMS at [facility], patient denies any radiation of the chest pain to his arms, neck, back.  States that it is a pressure-like pain in his left chest that has decreased spontaneously, is down from a 5-6 down to a 1-2.  Patient states that he is noncompliant with his medications, occasionally sees his doctor, Dr. Ramona Tony.  Denies any numbness, tingling, paresthesias, trouble breathing, dizziness, headache, rash, vision changes, hearing changes  symptoms all resolved now   11/9 s/p diagnostic cath via RRA, site without hematoma/bleeding. Patient without complaint, hemodynamically stable overnight. Pt cleared for discharge by Dr Adair.     < from: Transthoracic Echocardiogram (11.10.22 @ 07:14) >  Observations:  Mitral Valve: Normal mitral valve. Minimal mitral regurgitation.  Aortic Valve/Aorta: Normal aortic valve. . Minimal aortic regurgitation.  Normal aortic root size.  Left Atrium: Normal left atrium.  Left Ventricle: Normal left ventricular internal dimensions and wall thicknesses. Normal left ventricular systolic function. Paradoxic septal motion due to abnormal conduction. Normal diastolic function  Right Heart: Normal right atrium. Normal right ventricular size and function.  Normal tricuspid valve. Mild tricuspid regurgitation.  Normal pulmonic valve. Minimal pulmonic regurgitation.  Pericardium/Pleura: Normal pericardium with no pericardial effusion.  Hemodynamic: Estimated right atrial pressure is normal. No evidence of pulmonary hypertension.  ------------------------------------------------------------------------  Conclusions:  Normal left ventricular systolic function. Paradoxic septal motion due to abnormal conduction.  ----------------------------------------------------------------

## 2022-11-10 NOTE — DISCHARGE NOTE NURSING/CASE MANAGEMENT/SOCIAL WORK - PATIENT PORTAL LINK FT
You can access the FollowMyHealth Patient Portal offered by Health system by registering at the following website: [email or web address]. By joining [email or web address]’s FollowMyHealth portal, you will also be able to view your health information using other applications (apps) compatible with our system.

## 2022-11-10 NOTE — DISCHARGE NOTE NURSING/CASE MANAGEMENT/SOCIAL WORK - NSDCPEFALRISK_GEN_ALL_CORE
For information on Fall & Injury Prevention, visit: [email or web address] OR  [email or web address] OR  https://www.cdc.gov/steadi/patient.html

## 2022-11-10 NOTE — DISCHARGE NOTE PROVIDER - NSDCFUADDINST_GEN_ALL_CORE_FT
Wound Care:   the day AFTER your procedure remove bandage GENTLY, and clean using  mild soap and gentle warm, water stream, pat dry. leave OPEN to air. YOU MAY SHOWER   DO NOT apply lotions, creams, ointments, powder, perfumes to your incision site  DO NOT SOAK your site for 1 week (no baths, no pools, no tubs, etc...)  Check  your groin and /or wrist daily. A small amount of bruising, and soreness are normal    ACTIVITY: for 24 hours   - DO NOT DRIVE  - DO NOT make any important decisions or sign legal documents   - DO NOT operate heavy machineries  - you may resume sexual activity in 48 hours, unless otherwise instructed by your cardiologist     If your procedure was done through the WRIST: for the NEXT 3 DAYS:  - avoid pushing, pulling, with that affected wrist   - avoid repeated movement of that hand and wrist (eg: typing, hammering)  - DO NOT LIFT anything more than 5 lbs     If your procedure was done through the GROIN: for the NEXT 5 DAYS  - Limit climbing stairs, DO NOT soak in bathtub or pool  - no strenous activities, pushing, pulling, straining  - Do not lift anything 10lbs or heavier     MEDICATION:   take your medications as explained (see discharge paperwork)   If you received a STENT, you will be taking antiplatelet medications to KEEP YOUR STENT OPEN ( eg: Aspirin, Plavix, Brilinta, Effient, etc).  Take as prescribed DO NOT STOP taking them without consulting with your cardiologist first.     Follow heart healthy diet reccomended by your doctor, , if you smoke STOP SMOKING ( may call [phone] for center of tobacco control if you need assistance)     CALL your doctor to make appointment in 2 WEEKS     ***CALL YOUR DOCTOR***  if you experience: fever, chills, body aches, or severe pain, swelling, redness, heat or yellow discharge at incision site  If you experience Bleeding or excruciating pain at the procedural site, swelling ( golf ball size) at your procedural site  If you experience CHEST PAIN  If you experience extremity numbness, tingling, temperature change ( of your procedural site)   If you are unable to reach your doctor, you may contact:   -Cardiology Office at [facility] at [phone] or   - [facility] [phone]  - [facility] [phone]

## 2022-11-10 NOTE — PROGRESS NOTE ADULT - ASSESSMENT
M E D I C A L   A T T E N D I N G    F O L L O W    U P   N O T E  (11-10-22 )                                     ------------------------------------------------------------------------------------------------    patient evaluated by me, case discussed with team, chart, medications, and physical exam reviewed, labs / tests  and vitals reviewed by me, as bellow.   Patient is stable for discharge today.   Patient to follow up with  PMD, cardiology as discussed with pt   See discharge document for full note.  meds reviewed with NP..   [Greater than 35 min spent for these services. ]                             ( note written / Date of service  11-10-22 )    ==================>> MEDICATIONS <<====================    aspirin  chewable 81 milliGRAM(s) Oral daily  atorvastatin 40 milliGRAM(s) Oral at bedtime  carvedilol 6.25 milliGRAM(s) Oral every 12 hours  sodium chloride 0.9%. 1000 milliLiter(s) IV Continuous <Continuous>    MEDICATIONS  (PRN):  acetaminophen     Tablet .. 650 milliGRAM(s) Oral every 6 hours PRN Temp greater or equal to 38C (100.4F), Mild Pain (1 - 3)  aluminum hydroxide/magnesium hydroxide/simethicone Suspension 30 milliLiter(s) Oral every 4 hours PRN Dyspepsia  melatonin 3 milliGRAM(s) Oral at bedtime PRN Insomnia  ondansetron Injectable 4 milliGRAM(s) IV Push every 8 hours PRN Nausea and/or Vomiting    ___________  Active diet:  Diet, DASH/TLC:   Sodium & Cholesterol Restricted  No Beef  ___________________    ==================>> VITAL SIGNS <<==================    T(C): 36.7 (11-10-22 @ 12:41), Max: 36.8 (11-10-22 @ 09:57)  HR: 56 (11-10-22 @ 12:41) (54 - 78)  BP: 150/87 (11-10-22 @ 12:41) (122/59 - 168/83)  BP(mean): 96 (11-10-22 @ 04:42)  RR: 19 (11-10-22 @ 12:41) (16 - 19)  SpO2: 98% (11-10-22 @ 12:41) (95% - 100%)     I&O's Summary    09 Nov 2022 07:01  -  10 Nov 2022 07:00  --------------------------------------------------------  IN: 240 mL / OUT: 0 mL / NET: 240 mL    10 Nov 2022 07:01  -  10 Nov 2022 13:35  --------------------------------------------------------  IN: 480 mL / OUT: 0 mL / NET: 480 mL       ==================>> LAB AND IMAGING <<==================                        13.1   4.56  )-----------( 165      ( 10 Nov 2022 01:39 )             39.3        11-10    137  |  103  |  17  ----------------------------<  102<H>  3.8   |  22  |  1.31<H>    Ca    9.0      10 Nov 2022 01:39  Mg     2.2     11-09    TPro  7.0  /  Alb  3.9  /  TBili  0.4  /  DBili  x   /  AST  21  /  ALT  17  /  AlkPhos  54  11-10    PT/INR - ( 09 Nov 2022 10:26 )   PT: 12.0 sec;   INR: 1.03 ratio    PTT - ( 09 Nov 2022 10:26 )  PTT:33.4 sec                 TSH:      1.93   (11-09-22)           Lipid profile:  (11-09-22)     Total: 194     LDL  : (p)     HDL  :69     TG   :69     WBC count:   4.56 <<== ,  4.31 <<==   Hemoglobin:   13.1 <<==,  13.5 <<==  platelets:  165 <==, 167 <==    Creatinine:  1.31  <<==, 0.96  <<==  Sodium:   137  <==, 140  <==       AST:          21 <== , 20 <==      ALT:        17  <== , 18  <==      AP:        54  <=, 61  <=     Bili:        0.4  <=, 0.3  <=     < from: Transthoracic Echocardiogram (11.10.22 @ 07:14) >  Observations:  Mitral Valve: Normal mitral valve. Minimal mitral  regurgitation.  Aortic Valve/Aorta: Normal aortic valve. . Minimal aortic  regurgitation.  Normal aortic root size.  Left Atrium: Normal left atrium.  Left Ventricle: Normal left ventricular internal dimensions  and wall thicknesses.  Normal left ventricular systolic function. Paradoxic septal  motion due to abnormal conduction. Normal diastolic function  Right Heart: Normal right atrium. Normal right ventricular size and function.  Normal tricuspid valve. Mild tricuspid regurgitation.  Normal pulmonic valve. Minimal pulmonic regurgitation.  Pericardium/Pleura: Normal pericardium with no pericardial effusion.  Hemodynamic: Estimated right atrial pressure is normal.  No evidence of pulmonary hypertension.  ------------------------------------------------------------------------  Conclusions:  Normal left ventricular systolic function. Paradoxic septal  motion due to abnormal conduction.  ------------------------------------------------------------------------  Confirmed on  11/10/2022 - 10:35:58 by Vishal Egan MD,  VICKIE  < end of copied text >

## 2022-11-10 NOTE — DISCHARGE NOTE PROVIDER - NSDCCPCAREPLAN_GEN_ALL_CORE_FT
PRINCIPAL DISCHARGE DIAGNOSIS  Diagnosis: CAD (coronary artery disease)  Assessment and Plan of Treatment: s/p diagnostic cath- non obstructive CAD  Recommended medical management and risk factor modification      SECONDARY DISCHARGE DIAGNOSES  Diagnosis: Chest pain  Assessment and Plan of Treatment:

## 2022-11-10 NOTE — PROGRESS NOTE ADULT - SUBJECTIVE AND OBJECTIVE BOX
Patient seen and examined at bedside.    Overnight Events: Cath with non-obstructive disease    Review Of Systems: No chest pain, shortness of breath, or palpitations            Current Meds:  acetaminophen     Tablet .. 650 milliGRAM(s) Oral every 6 hours PRN  aluminum hydroxide/magnesium hydroxide/simethicone Suspension 30 milliLiter(s) Oral every 4 hours PRN  aspirin  chewable 81 milliGRAM(s) Oral daily  carvedilol 6.25 milliGRAM(s) Oral every 12 hours  melatonin 3 milliGRAM(s) Oral at bedtime PRN  ondansetron Injectable 4 milliGRAM(s) IV Push every 8 hours PRN  sodium chloride 0.9%. 1000 milliLiter(s) IV Continuous <Continuous>      Vitals:  T(F): 97.7 (-10), Max: 98 ()  HR: 60 (-10) (54 - 78)  BP: 131/85 (11-10) (122/59 - 187/102)  RR: 18 (-10)  SpO2: 99% (10)  I&O's Summary    2022 07:01  -  10 Nov 2022 06:55  --------------------------------------------------------  IN: 240 mL / OUT: 0 mL / NET: 240 mL        Physical Exam:  Appearance: No acute distress; well appearing  Eyes: PERRL, EOMI, pink conjunctiva  HEENT: Normal oral mucosa  Cardiovascular: RRR, S1, S2, no murmurs, rubs, or gallops; no edema; no JVD  Respiratory: Clear to auscultation bilaterally  Gastrointestinal: soft, non-tender, non-distended with normal bowel sounds  Musculoskeletal: No clubbing; no joint deformity   Neurologic: Non-focal  Lymphatic: No lymphadenopathy  Psychiatry: AAOx3, mood & affect appropriate  Skin: No rashes, ecchymoses, or cyanosis                          13.1   4.56  )-----------( 165      ( 10 Nov 2022 01:39 )             39.3     11-10    137  |  103  |  17  ----------------------------<  102<H>  3.8   |  22  |  1.31<H>    Ca    9.0      10 Nov 2022 01:39  Mg     2.2         TPro  7.0  /  Alb  3.9  /  TBili  0.4  /  DBili  x   /  AST  21  /  ALT  17  /  AlkPhos  54  11-10    PT/INR - ( 2022 10:26 )   PT: 12.0 sec;   INR: 1.03 ratio         PTT - ( 2022 10:26 )  PTT:33.4 sec  CARDIAC MARKERS ( 2022 14:11 )  7 ng/L / x     / x     / x     / x     / 1.4 ng/mL  CARDIAC MARKERS ( 2022 09:32 )  7 ng/L / x     / x     / x     / x     / x          Serum Pro-Brain Natriuretic Peptide: 24 pg/mL ( @ 09:32)    Total Cholesterol: 194  LDL: --  HDL: 69  T        New ECG(s): Personally reviewed    Echo:    Stress Testing:     Cath:    22  LM   Left main artery: Angiography shows no disease.      LAD   Proximal left anterior descending: Angiography shows mild  atherosclerosis. Mid left anterior descending: Myocardial bridging  noted.      CX   Proximal circumflex: There is a 20 % stenosis. Distal circumflex:  There is a 30 % stenosis.    RCA   Mid right coronary artery: There is a 40 % stenosis.      Imaging:    Interpretation of Telemetry:   Patient seen and examined at bedside.    Cardiac cath showed non-obstructive disease  Patient reports no chest pain, shortness of breath, or palpitations              Current Meds:  acetaminophen     Tablet .. 650 milliGRAM(s) Oral every 6 hours PRN  aluminum hydroxide/magnesium hydroxide/simethicone Suspension 30 milliLiter(s) Oral every 4 hours PRN  aspirin  chewable 81 milliGRAM(s) Oral daily  carvedilol 6.25 milliGRAM(s) Oral every 12 hours  melatonin 3 milliGRAM(s) Oral at bedtime PRN  ondansetron Injectable 4 milliGRAM(s) IV Push every 8 hours PRN  sodium chloride 0.9%. 1000 milliLiter(s) IV Continuous <Continuous>      Vitals:  T(F): 97.7 (11-10), Max: 98 ()  HR: 60 (10) (54 - 78)  BP: 131/85 (11-10) (122/59 - 187/102)  RR: 18 (-10)  SpO2: 99% (10)      Physical Exam:  Appearance: No acute distress; well appearing  Eyes: PERRL, EOMI, pink conjunctiva  HEENT: Normal oral mucosa  Cardiovascular: RRR, S1, S2, no murmurs, rubs, or gallops; no edema; no JVD  Respiratory: Clear to auscultation bilaterally  Gastrointestinal: soft, non-tender, non-distended with normal bowel sounds  Musculoskeletal: No clubbing; no joint deformity   Neurologic: Non-focal  Lymphatic: No lymphadenopathy  Psychiatry: AAOx3, mood & affect appropriate  Skin: No rashes, ecchymoses, or cyanosis               LABS:             13.1   4.56  )-----------( 165      ( 10 Nov 2022 01:39 )             39.3     11-10  137  |  103  |  17  ----------------------------<  102<H>  3.8   |  22  |  1.31<H>    Ca    9.0      10 Nov 2022 01:39  Mg     2.2         TPro  7.0  /  Alb  3.9  /  TBili  0.4  /  DBili  x   /  AST  21  /  ALT  17  /  AlkPhos  54  11-10    PT/INR - ( 2022 10:26 )   PT: 12.0 sec;   INR: 1.03 ratio    PTT - ( 2022 10:26 )  PTT:33.4 sec    CARDIAC MARKERS ( 2022 14:11 )  7 ng/L / x     / x     / x     / x     / 1.4 ng/mL  CARDIAC MARKERS ( 2022 09:32 )  7 ng/L / x     / x     / x     / x     / x        Serum Pro-Brain Natriuretic Peptide: 24 pg/mL ( @ 09:32)    Total Cholesterol: 194  LDL: --  HDL: 69  T    22  LM   Left main artery: Angiography shows no disease.      LAD   Proximal left anterior descending: Angiography shows mild atherosclerosis. Mid left anterior descending: Myocardial bridging noted.      CX   Proximal circumflex: There is a 20 % stenosis. Distal circumflex: There is a 30 % stenosis.    RCA   Mid right coronary artery: There is a 40 % stenosis.

## 2022-11-10 NOTE — DISCHARGE NOTE PROVIDER - NSDCCAREPROVSEEN_GEN_ALL_CORE_FT
Jesenia Adair Jesenia Oakes  Consults Teaching [facility] Cardiology  Advance PracticeTeam [facility] Medicine      [ Greater than 35 min spent for discharge services.   ADINA Adair ]

## 2022-11-10 NOTE — DISCHARGE NOTE NURSING/CASE MANAGEMENT/SOCIAL WORK - NSTRANSFERBELONGINGSDISPO_GEN_A_NUR
Patient came for a nurse visit for blood pressure check  Patient's blood pressure is 113/75 with a pulse of 73  Patient stated she had no symptoms, no dizziness, headaches, palpitations or chest pain  Patient does check her blood pressure at home and did provide a blood pressure log for her nurse visit today 
with patient

## 2022-11-10 NOTE — DISCHARGE NOTE PROVIDER - NSDCMRMEDTOKEN_GEN_ALL_CORE_FT
aspirin 81 mg oral tablet, chewable: 1 tab(s) orally once a day  carvedilol 6.25 mg oral tablet: 1 tab(s) orally every 12 hours   aspirin 81 mg oral tablet, chewable: 1 tab(s) orally once a day  carvedilol 6.25 mg oral tablet: 1 tab(s) orally every 12 hours  Lipitor 40 mg oral tablet: 1 tab(s) orally once a day (at bedtime) MDD:1

## 2022-11-10 NOTE — PROGRESS NOTE ADULT - ASSESSMENT
62-year-old male with a past medical history of hypertension, high cholesterol, non obstructive CAD at cath in 2008.  Non compliant with medications.  Now presenting with chest pain this morning approximately 6 AM; in retrospect, he also has been having chest pain with exertion over the past 5-6 months, consistent with angina.       REC:  #1  Unstable angina  - Cath shows non obstructive CAD  - Start patient on ASA 81mg  - , check A1c  - Start patient on atorvastatin 40mg  - Continue with coreg 6.25mg BID  - Continue to monitor on telemetry  - Please obtain TTE  - Trop negative, can stop trending  - trend CMP, Mg, phos, CBC  - maintain K > 4, Mg > 2  - maintain active T&S, Hgb > 8; will monitor for s/s of bleeding     #2  HTN  - Start patient on Coreg 6.25 BID as patient is currently hypertensive      Keshav Mariano M.D.  Cardiology fellow   62-year-old male with a past medical history of hypertension, high cholesterol, non-obstructive CAD at cath in 2008.  Non-compliant with medications.  Presented with chest pain on morning of admission; in retrospect, he also has been having chest pain with exertion over the past 5-6 months, consistent with angina.       REC:  #1  Angina - - Cath shows non obstructive CAD  - continue ASA 81mg qd  - , check A1c  - Start patient on atorvastatin 40mg  - Continue with Coreg 6.25mg BID  - Continue to monitor on telemetry  - Please obtain TTE  - Trop negative, can stop trending  - trend CMP, Mg, phos, CBC  - maintain K > 4, Mg > 2  - maintain active T&S, Hgb > 8; will monitor for s/s of bleeding     #2  HTN  - continue Coreg 6.25 BID as patient is hypertensive      Keshav Mariano M.D.  Cardiology fellow    Plan discussed with cardiology fellow.  Patient seen and examined.  Hx., exam and labs as above.  I agree with the assessment and recommendations, which I have reviewed and edited where appropriate.  Luisito Daniel M.D.  Cardiology Attending, Consult Service    For Cardiology consults and questions, all Cardiology service information can be found 24/7 on [email or web address] - use password: [email or web address] to log in.

## 2022-11-10 NOTE — DISCHARGE NOTE PROVIDER - CARE PROVIDER_API CALL
Luisito Daniel (MD)  Cardiovascular Disease; Internal Medicine  [address], [facility] [address]  [place] [address]  Phone: [phone]  Fax: [phone]  Follow Up Time: 2 weeks

## 2022-11-10 NOTE — DISCHARGE NOTE PROVIDER - NSDCCPTREATMENT_GEN_ALL_CORE_FT
PRINCIPAL PROCEDURE  Procedure: Left heart cardiac catheterization  Findings and Treatment: Study Date:     2022   Name:           SAGE SPARKS   :            1960   (62 years)   Cath Lab Report    Diagnostic Cardiologist:       Jass Oakes DO   Fellow:                        Lana Millan MD   Referring Physician:           Luisito Daniel MD   Admitting Physician:           ISMAEL JENNINGS   Procedures Performed   Procedures:          1.    Arterial Access - Right Radial   2.    Diagnostic Coronary Angiography   3.    Left Heart Cath   Indications:               Chest pain   Diagnostic Conclusions:   Non obstructive CAD.   Recommend medical management and risk factor modification.

## 2022-12-12 PROBLEM — I10 ESSENTIAL (PRIMARY) HYPERTENSION: Chronic | Status: ACTIVE | Noted: 2022-11-09

## 2022-12-12 PROBLEM — I25.10 ATHEROSCLEROTIC HEART DISEASE OF NATIVE CORONARY ARTERY WITHOUT ANGINA PECTORIS: Chronic | Status: ACTIVE | Noted: 2022-11-09

## 2022-12-12 PROBLEM — Z95.5 PRESENCE OF CORONARY ANGIOPLASTY IMPLANT AND GRAFT: Chronic | Status: ACTIVE | Noted: 2022-11-09

## 2023-03-06 ENCOUNTER — APPOINTMENT (OUTPATIENT)
Dept: CARDIOLOGY | Facility: CLINIC | Age: 63
End: 2023-03-06

## 2025-06-10 ENCOUNTER — APPOINTMENT (OUTPATIENT)
Dept: OPHTHALMOLOGY | Facility: CLINIC | Age: 65
End: 2025-06-10
Payer: SELF-PAY

## 2025-06-10 ENCOUNTER — NON-APPOINTMENT (OUTPATIENT)
Age: 65
End: 2025-06-10

## 2025-06-10 ENCOUNTER — APPOINTMENT (OUTPATIENT)
Dept: OPHTHALMOLOGY | Facility: CLINIC | Age: 65
End: 2025-06-10
Payer: MEDICARE

## 2025-06-10 PROCEDURE — 92015 DETERMINE REFRACTIVE STATE: CPT

## 2025-06-10 PROCEDURE — 92004 COMPRE OPH EXAM NEW PT 1/>: CPT | Mod: 25
